# Patient Record
Sex: FEMALE | Race: WHITE | NOT HISPANIC OR LATINO
[De-identification: names, ages, dates, MRNs, and addresses within clinical notes are randomized per-mention and may not be internally consistent; named-entity substitution may affect disease eponyms.]

---

## 2020-05-20 ENCOUNTER — APPOINTMENT (OUTPATIENT)
Dept: OBGYN | Facility: CLINIC | Age: 25
End: 2020-05-20

## 2021-02-11 ENCOUNTER — RESULT REVIEW (OUTPATIENT)
Age: 26
End: 2021-02-11

## 2021-08-03 ENCOUNTER — TRANSCRIPTION ENCOUNTER (OUTPATIENT)
Age: 26
End: 2021-08-03

## 2021-08-03 ENCOUNTER — INPATIENT (INPATIENT)
Facility: HOSPITAL | Age: 26
LOS: 2 days | Discharge: HOME | End: 2021-08-06
Payer: MEDICAID

## 2021-08-03 VITALS — TEMPERATURE: 98 F

## 2021-08-03 DIAGNOSIS — Z90.49 ACQUIRED ABSENCE OF OTHER SPECIFIED PARTS OF DIGESTIVE TRACT: Chronic | ICD-10-CM

## 2021-08-03 DIAGNOSIS — O47.1 FALSE LABOR AT OR AFTER 37 COMPLETED WEEKS OF GESTATION: ICD-10-CM

## 2021-08-03 DIAGNOSIS — Z98.89 OTHER SPECIFIED POSTPROCEDURAL STATES: Chronic | ICD-10-CM

## 2021-08-04 ENCOUNTER — TRANSCRIPTION ENCOUNTER (OUTPATIENT)
Age: 26
End: 2021-08-04

## 2021-08-04 DIAGNOSIS — O34.219 MATERNAL CARE FOR UNSPECIFIED TYPE SCAR FROM PREVIOUS CESAREAN DELIVERY: ICD-10-CM

## 2021-08-04 LAB
ALLERGY+IMMUNOLOGY DIAG STUDY NOTE: SIGNIFICANT CHANGE UP
BASOPHILS # BLD AUTO: 0.04 K/UL — SIGNIFICANT CHANGE UP (ref 0–0.2)
BASOPHILS NFR BLD AUTO: 0.2 % — SIGNIFICANT CHANGE UP (ref 0–2)
BLD GP AB SCN SERPL QL: SIGNIFICANT CHANGE UP
COVID-19 SPIKE DOMAIN AB INTERP: NEGATIVE — SIGNIFICANT CHANGE UP
COVID-19 SPIKE DOMAIN AB INTERP: NEGATIVE — SIGNIFICANT CHANGE UP
COVID-19 SPIKE DOMAIN ANTIBODY RESULT: 0.4 U/ML — SIGNIFICANT CHANGE UP
COVID-19 SPIKE DOMAIN ANTIBODY RESULT: 0.4 U/ML — SIGNIFICANT CHANGE UP
DIR ANTIGLOB POLYSPECIFIC INTERPRETATION: SIGNIFICANT CHANGE UP
EOSINOPHIL # BLD AUTO: 0.05 K/UL — SIGNIFICANT CHANGE UP (ref 0–0.5)
EOSINOPHIL NFR BLD AUTO: 0.3 % — SIGNIFICANT CHANGE UP (ref 0–6)
HCT VFR BLD CALC: 35.4 % — SIGNIFICANT CHANGE UP (ref 34.5–45)
HCT VFR BLD CALC: 40.4 % — SIGNIFICANT CHANGE UP (ref 34.5–45)
HGB BLD-MCNC: 11.7 G/DL — SIGNIFICANT CHANGE UP (ref 11.5–15.5)
HGB BLD-MCNC: 13.5 G/DL — SIGNIFICANT CHANGE UP (ref 11.5–15.5)
IMM GRANULOCYTES NFR BLD AUTO: 0.5 % — SIGNIFICANT CHANGE UP (ref 0–1.5)
LYMPHOCYTES # BLD AUTO: 1.72 K/UL — SIGNIFICANT CHANGE UP (ref 1–3.3)
LYMPHOCYTES # BLD AUTO: 10 % — LOW (ref 13–44)
MCHC RBC-ENTMCNC: 29.6 PG — SIGNIFICANT CHANGE UP (ref 27–34)
MCHC RBC-ENTMCNC: 30.1 PG — SIGNIFICANT CHANGE UP (ref 27–34)
MCHC RBC-ENTMCNC: 33.1 GM/DL — SIGNIFICANT CHANGE UP (ref 32–36)
MCHC RBC-ENTMCNC: 33.4 GM/DL — SIGNIFICANT CHANGE UP (ref 32–36)
MCV RBC AUTO: 89.6 FL — SIGNIFICANT CHANGE UP (ref 80–100)
MCV RBC AUTO: 90 FL — SIGNIFICANT CHANGE UP (ref 80–100)
MONOCYTES # BLD AUTO: 0.72 K/UL — SIGNIFICANT CHANGE UP (ref 0–0.9)
MONOCYTES NFR BLD AUTO: 4.2 % — SIGNIFICANT CHANGE UP (ref 2–14)
NEUTROPHILS # BLD AUTO: 14.63 K/UL — HIGH (ref 1.8–7.4)
NEUTROPHILS NFR BLD AUTO: 84.8 % — HIGH (ref 43–77)
PLATELET # BLD AUTO: 192 K/UL — SIGNIFICANT CHANGE UP (ref 150–400)
PLATELET # BLD AUTO: 221 K/UL — SIGNIFICANT CHANGE UP (ref 150–400)
RBC # BLD: 3.95 M/UL — SIGNIFICANT CHANGE UP (ref 3.8–5.2)
RBC # BLD: 4.49 M/UL — SIGNIFICANT CHANGE UP (ref 3.8–5.2)
RBC # FLD: 13.1 % — SIGNIFICANT CHANGE UP (ref 10.3–14.5)
RBC # FLD: 13.2 % — SIGNIFICANT CHANGE UP (ref 10.3–14.5)
SARS-COV-2 IGG+IGM SERPL QL IA: 0.4 U/ML — SIGNIFICANT CHANGE UP
SARS-COV-2 IGG+IGM SERPL QL IA: 0.4 U/ML — SIGNIFICANT CHANGE UP
SARS-COV-2 IGG+IGM SERPL QL IA: NEGATIVE — SIGNIFICANT CHANGE UP
SARS-COV-2 IGG+IGM SERPL QL IA: NEGATIVE — SIGNIFICANT CHANGE UP
SARS-COV-2 RNA SPEC QL NAA+PROBE: SIGNIFICANT CHANGE UP
T PALLIDUM AB TITR SER: NEGATIVE — SIGNIFICANT CHANGE UP
WBC # BLD: 17.25 K/UL — HIGH (ref 3.8–10.5)
WBC # BLD: 21.29 K/UL — HIGH (ref 3.8–10.5)
WBC # FLD AUTO: 17.25 K/UL — HIGH (ref 3.8–10.5)
WBC # FLD AUTO: 21.29 K/UL — HIGH (ref 3.8–10.5)

## 2021-08-04 PROCEDURE — 86077 PHYS BLOOD BANK SERV XMATCH: CPT

## 2021-08-04 RX ORDER — SODIUM CHLORIDE 9 MG/ML
1000 INJECTION, SOLUTION INTRAVENOUS
Refills: 0 | Status: DISCONTINUED | OUTPATIENT
Start: 2021-08-04 | End: 2021-08-06

## 2021-08-04 RX ORDER — FAMOTIDINE 10 MG/ML
20 INJECTION INTRAVENOUS ONCE
Refills: 0 | Status: COMPLETED | OUTPATIENT
Start: 2021-08-04 | End: 2021-08-04

## 2021-08-04 RX ORDER — ACETAMINOPHEN 500 MG
975 TABLET ORAL
Refills: 0 | Status: DISCONTINUED | OUTPATIENT
Start: 2021-08-04 | End: 2021-08-06

## 2021-08-04 RX ORDER — GENTAMICIN SULFATE 40 MG/ML
330 VIAL (ML) INJECTION ONCE
Refills: 0 | Status: COMPLETED | OUTPATIENT
Start: 2021-08-04 | End: 2021-08-04

## 2021-08-04 RX ORDER — IBUPROFEN 200 MG
600 TABLET ORAL EVERY 6 HOURS
Refills: 0 | Status: COMPLETED | OUTPATIENT
Start: 2021-08-04 | End: 2022-07-03

## 2021-08-04 RX ORDER — OXYTOCIN 10 UNIT/ML
333.33 VIAL (ML) INJECTION
Qty: 20 | Refills: 0 | Status: DISCONTINUED | OUTPATIENT
Start: 2021-08-04 | End: 2021-08-06

## 2021-08-04 RX ORDER — SODIUM CHLORIDE 9 MG/ML
1000 INJECTION, SOLUTION INTRAVENOUS ONCE
Refills: 0 | Status: COMPLETED | OUTPATIENT
Start: 2021-08-04 | End: 2021-08-04

## 2021-08-04 RX ORDER — OXYCODONE HYDROCHLORIDE 5 MG/1
5 TABLET ORAL ONCE
Refills: 0 | Status: DISCONTINUED | OUTPATIENT
Start: 2021-08-04 | End: 2021-08-06

## 2021-08-04 RX ORDER — ONDANSETRON 8 MG/1
4 TABLET, FILM COATED ORAL ONCE
Refills: 0 | Status: COMPLETED | OUTPATIENT
Start: 2021-08-04 | End: 2021-08-04

## 2021-08-04 RX ORDER — MAGNESIUM HYDROXIDE 400 MG/1
30 TABLET, CHEWABLE ORAL
Refills: 0 | Status: DISCONTINUED | OUTPATIENT
Start: 2021-08-04 | End: 2021-08-06

## 2021-08-04 RX ORDER — OXYCODONE HYDROCHLORIDE 5 MG/1
5 TABLET ORAL
Refills: 0 | Status: DISCONTINUED | OUTPATIENT
Start: 2021-08-04 | End: 2021-08-06

## 2021-08-04 RX ORDER — SODIUM CHLORIDE 9 MG/ML
1000 INJECTION, SOLUTION INTRAVENOUS
Refills: 0 | Status: DISCONTINUED | OUTPATIENT
Start: 2021-08-04 | End: 2021-08-04

## 2021-08-04 RX ORDER — CEFAZOLIN SODIUM 1 G
3000 VIAL (EA) INJECTION ONCE
Refills: 0 | Status: DISCONTINUED | OUTPATIENT
Start: 2021-08-04 | End: 2021-08-04

## 2021-08-04 RX ORDER — SIMETHICONE 80 MG/1
80 TABLET, CHEWABLE ORAL EVERY 4 HOURS
Refills: 0 | Status: DISCONTINUED | OUTPATIENT
Start: 2021-08-04 | End: 2021-08-06

## 2021-08-04 RX ORDER — AZITHROMYCIN 500 MG/1
500 TABLET, FILM COATED ORAL ONCE
Refills: 0 | Status: COMPLETED | OUTPATIENT
Start: 2021-08-04 | End: 2021-08-04

## 2021-08-04 RX ORDER — TETANUS TOXOID, REDUCED DIPHTHERIA TOXOID AND ACELLULAR PERTUSSIS VACCINE, ADSORBED 5; 2.5; 8; 8; 2.5 [IU]/.5ML; [IU]/.5ML; UG/.5ML; UG/.5ML; UG/.5ML
0.5 SUSPENSION INTRAMUSCULAR ONCE
Refills: 0 | Status: DISCONTINUED | OUTPATIENT
Start: 2021-08-04 | End: 2021-08-06

## 2021-08-04 RX ORDER — CITRIC ACID/SODIUM CITRATE 300-500 MG
30 SOLUTION, ORAL ORAL ONCE
Refills: 0 | Status: COMPLETED | OUTPATIENT
Start: 2021-08-04 | End: 2021-08-04

## 2021-08-04 RX ORDER — KETOROLAC TROMETHAMINE 30 MG/ML
30 SYRINGE (ML) INJECTION EVERY 6 HOURS
Refills: 0 | Status: DISCONTINUED | OUTPATIENT
Start: 2021-08-04 | End: 2021-08-06

## 2021-08-04 RX ORDER — LANOLIN
1 OINTMENT (GRAM) TOPICAL EVERY 6 HOURS
Refills: 0 | Status: DISCONTINUED | OUTPATIENT
Start: 2021-08-04 | End: 2021-08-06

## 2021-08-04 RX ORDER — DIPHENHYDRAMINE HCL 50 MG
25 CAPSULE ORAL EVERY 6 HOURS
Refills: 0 | Status: DISCONTINUED | OUTPATIENT
Start: 2021-08-04 | End: 2021-08-06

## 2021-08-04 RX ORDER — ENOXAPARIN SODIUM 100 MG/ML
60 INJECTION SUBCUTANEOUS DAILY
Refills: 0 | Status: DISCONTINUED | OUTPATIENT
Start: 2021-08-04 | End: 2021-08-06

## 2021-08-04 RX ADMIN — Medication 975 MILLIGRAM(S): at 21:05

## 2021-08-04 RX ADMIN — Medication 100 MILLIGRAM(S): at 03:00

## 2021-08-04 RX ADMIN — Medication 1000 MILLIUNIT(S)/MIN: at 04:11

## 2021-08-04 RX ADMIN — SODIUM CHLORIDE 125 MILLILITER(S): 9 INJECTION, SOLUTION INTRAVENOUS at 02:31

## 2021-08-04 RX ADMIN — FAMOTIDINE 20 MILLIGRAM(S): 10 INJECTION INTRAVENOUS at 02:29

## 2021-08-04 RX ADMIN — Medication 30 MILLIGRAM(S): at 23:55

## 2021-08-04 RX ADMIN — Medication 975 MILLIGRAM(S): at 10:00

## 2021-08-04 RX ADMIN — ONDANSETRON 4 MILLIGRAM(S): 8 TABLET, FILM COATED ORAL at 07:49

## 2021-08-04 RX ADMIN — Medication 975 MILLIGRAM(S): at 15:00

## 2021-08-04 RX ADMIN — Medication 975 MILLIGRAM(S): at 14:22

## 2021-08-04 RX ADMIN — AZITHROMYCIN 255 MILLIGRAM(S): 500 TABLET, FILM COATED ORAL at 02:50

## 2021-08-04 RX ADMIN — Medication 30 MILLIGRAM(S): at 17:22

## 2021-08-04 RX ADMIN — Medication 975 MILLIGRAM(S): at 09:17

## 2021-08-04 RX ADMIN — Medication 30 MILLIGRAM(S): at 12:28

## 2021-08-04 RX ADMIN — Medication 30 MILLILITER(S): at 02:29

## 2021-08-04 RX ADMIN — Medication 300 MILLIGRAM(S): at 03:21

## 2021-08-04 RX ADMIN — SODIUM CHLORIDE 2000 MILLILITER(S): 9 INJECTION, SOLUTION INTRAVENOUS at 00:30

## 2021-08-04 RX ADMIN — ENOXAPARIN SODIUM 60 MILLIGRAM(S): 100 INJECTION SUBCUTANEOUS at 14:23

## 2021-08-04 RX ADMIN — Medication 30 MILLIGRAM(S): at 17:07

## 2021-08-04 RX ADMIN — Medication 30 MILLIGRAM(S): at 12:12

## 2021-08-04 RX ADMIN — Medication 975 MILLIGRAM(S): at 21:50

## 2021-08-04 NOTE — OB RN DELIVERY SUMMARY - NSSELHIDDEN_OBGYN_ALL_OB_FT
[NS_DeliveryAttending1_OBGYN_ALL_OB_FT:[id number]==],[NS_DeliveryAssist1_OBGYN_ALL_OB_FT:[id number]=],[NS_CirculateRN2_OBGYN_ALL_OB_FT:[id number]==]

## 2021-08-04 NOTE — OB PROVIDER H&P - HISTORY OF PRESENT ILLNESS
26y  at 36w6d LMP consistent with 1st trimester sono who presents to L&D with contractions since yesterday, worsening and increasing in freq q 2-3 min_. Patient denies vaginal bleeding, leakage of fluid. She endorses good fetal movement. Denies fevers, chills, nausea, vomiting, chest pain, SOB, dizziness and headache. No other complaints at this time.   JETT: 21  LMP: 20    Prenatal course uncomplicated.      POB: ,  C/S for breech PTL, 36w  PGYN: -fibroids, -ovarian cysts, denies STD hx, denies abnormal PAPs   PMH: Denies  PSH: Cholecystectomy  SH: Denies EtOH, tobacco and illicit drug use during this pregnancy; feels safe at home   Meds: PNVs  Allergies: Amoxicillin    BMI: 45.2  Sono: Vertex, anterior placenta

## 2021-08-04 NOTE — OB PROVIDER DELIVERY SUMMARY - NSSELHIDDEN_OBGYN_ALL_OB_FT
[NS_DeliveryAttending1_OBGYN_ALL_OB_FT:[id number]==],[NS_DeliveryAssist1_OBGYN_ALL_OB_FT:[id number]=]

## 2021-08-04 NOTE — OB PROVIDER H&P - ASSESSMENT
A/P: 26y  at 36w6d by LMP consistent with 1st trimester sono prior C/Sx1 who presents to L&D in labor  - Pt cervical exam changed from 1.5/50/-3 to 3/50/-3 after 2L bolus  -Admit to L&D  -Consent for C/S  -Admission labs  -NPO, except ice chips   -IV fluids  -Labor: Intact. Latent labor  -Fetus: Cat I tracing. Continuous toco and fetal monitoring.   -GBS: Negative, no GBS ppx required   -Analgesia: Epidural    Discussed with Dr. Cagle

## 2021-08-04 NOTE — DISCHARGE NOTE OB - PATIENT PORTAL LINK FT
You can access the FollowMyHealth Patient Portal offered by [facility] by registering at the following website: [email or web address]. By joining [employer]’s FollowMyHealth portal, you will also be able to view your health information using other applications (apps) compatible with our system.

## 2021-08-04 NOTE — OB RN DELIVERY SUMMARY - NS_SEPSISRSKCALC_OBGYN_ALL_OB_FT
No temperature has been documented for this patient in CPN or on the OB Flowsheet. Ensure the highest temperature during labor was documented on the OB Flowsheet.  No gestational age at birth has been documented. Ensure delivery date/time has been entered above.  Rupture of membranes must be entered above.   EOS calculated successfully. EOS Risk Factor: 0.5/1000 live births ([facility] national incidence); GA=37w;Temp=97.9; ROM=0; GBS='Negative'; Antibiotics='No antibiotics or any antibiotics < 2 hrs prior to birth'

## 2021-08-04 NOTE — DISCHARGE NOTE OB - MEDICATION SUMMARY - MEDICATIONS TO STOP TAKING
I will STOP taking the medications listed below when I get home from the hospital:    Ortho Tri-Cyclen Lo oral tablet  -- 1 tab(s) by mouth once a day

## 2021-08-04 NOTE — OB PROVIDER H&P - NSHPPHYSICALEXAM_GEN_ALL_CORE
T(C): 36.6 (08-03-21 @ 23:09), Max: 36.6 (08-03-21 @ 22:50)  HR: 94 (08-03-21 @ 23:09) (94 - 94)  BP: 135/84 (08-03-21 @ 23:09) (135/84 - 135/84)  RR: 18 (08-03-21 @ 23:09) (18 - 18)      Gen: NAD, well-appearing, AAOx3   Abd: Soft, gravid  Ext: non-tender, non-edematous  SVE: 1.5/50/-3, repeat exam 3.0/50/-3  Bedside sono: Vertex, anterior placenta  FHT:145bpm, moderate variability, + acels, no decels  [place]: q6min

## 2021-08-04 NOTE — DISCHARGE NOTE OB - CARE PROVIDER_API CALL
Mansi Almonte)  Obstetrics and Gynecology  [address] [place] [address]  Phone: [phone]  Fax: [phone]  Follow Up Time:

## 2021-08-04 NOTE — OB PROVIDER DELIVERY SUMMARY - NSPROVIDERDELIVERYNOTE_OBGYN_ALL_OB_FT
27yo  presenting in labor, admitted for rCS>  Patient was taken to the OR, a repeat low transverse  section was performed and a viable male infant was delivered atraumatically in ___ presentation at ____, Placenta delivered at ____. Hysterotomy, fascia, subcutaneous and skin were reapproximated with suture. Excellent hemostasis was obtained at each layer of closure.  Apgars 9,9.  EBL ___. 27yo  presenting in labor, admitted for rCS.  Patient was taken to the OR, a repeat low transverse  section was performed and a viable male infant was delivered atraumatically in vertex presentation at 03:20. Placenta delivered at 03:21. Hysterotomy, fascia, subcutaneous and skin were reapproximated with suture. Excellent hemostasis was obtained at each layer of closure.  Apgars 9,9.  .

## 2021-08-04 NOTE — OB RN INTRAOPERATIVE NOTE - NSSELHIDDEN_OBGYN_ALL_OB_FT
[NS_DeliveryAttending1_OBGYN_ALL_OB_FT:[id number]==],[NS_DeliveryAssist1_OBGYN_ALL_OB_FT:[id number]]

## 2021-08-04 NOTE — DISCHARGE NOTE OB - MEDICATION SUMMARY - MEDICATIONS TO TAKE
I will START or STAY ON the medications listed below when I get home from the hospital:    acetaminophen 325 mg oral tablet  -- 3 tab(s) by mouth 3 times a day   -- Indication: For  delivery delivered    ibuprofen 600 mg oral tablet  -- 1 tab(s) by mouth every 6 hours  -- Indication: For  delivery delivered

## 2021-08-04 NOTE — DISCHARGE NOTE OB - MATERIALS PROVIDED
[facility] [place] Screening Program/Breastfeeding Log/Bottle Feeding Log/Breastfeeding Mother’s Support Group Information/Guide to Postpartum Care/Back To Sleep Handout/Shaken Baby Prevention Handout

## 2021-08-04 NOTE — DISCHARGE NOTE OB - CARE PLAN
Principal Discharge DX:	 delivery delivered   Principal Discharge DX:	 delivery delivered  Goal:	Postpartum recovery  Assessment and plan of treatment:	Follow up 2 weeks for incision check and 6 weeks postpartum

## 2021-08-04 NOTE — OB RN INTRAOPERATIVE NOTE - NSOBSELHIDDEN_OBGYN_ALL_OB_FT
[NSOBAttendingProcedure1_OBGYN_ALL_OB_FT:[id number]==],[NSRNCirculator2Procedure1_OBGYN_ALL_OB_FT:[id number]==]

## 2021-08-05 RX ADMIN — Medication 30 MILLIGRAM(S): at 18:45

## 2021-08-05 RX ADMIN — Medication 975 MILLIGRAM(S): at 22:49

## 2021-08-05 RX ADMIN — Medication 975 MILLIGRAM(S): at 02:53

## 2021-08-05 RX ADMIN — Medication 975 MILLIGRAM(S): at 09:05

## 2021-08-05 RX ADMIN — Medication 975 MILLIGRAM(S): at 03:38

## 2021-08-05 RX ADMIN — Medication 975 MILLIGRAM(S): at 22:04

## 2021-08-05 RX ADMIN — Medication 975 MILLIGRAM(S): at 16:26

## 2021-08-05 RX ADMIN — Medication 975 MILLIGRAM(S): at 15:32

## 2021-08-05 RX ADMIN — Medication 30 MILLIGRAM(S): at 00:10

## 2021-08-05 RX ADMIN — Medication 30 MILLIGRAM(S): at 12:45

## 2021-08-05 RX ADMIN — Medication 30 MILLIGRAM(S): at 05:34

## 2021-08-05 RX ADMIN — Medication 30 MILLIGRAM(S): at 18:32

## 2021-08-05 RX ADMIN — ENOXAPARIN SODIUM 60 MILLIGRAM(S): 100 INJECTION SUBCUTANEOUS at 15:32

## 2021-08-05 RX ADMIN — Medication 30 MILLIGRAM(S): at 12:28

## 2021-08-05 RX ADMIN — Medication 30 MILLIGRAM(S): at 05:49

## 2021-08-05 RX ADMIN — Medication 975 MILLIGRAM(S): at 10:02

## 2021-08-05 NOTE — CHART NOTE - NSCHARTNOTEFT_GEN_A_CORE
Antibody Interpretation: Anti-D secondary to RhIG    Patient is a 26 year old  at 36w6d LMP consistent with 1st trimester sono who presents to L&D with contractions since yesterday, worsening and increasing in freq q 2-3 min. Patient denies vaginal bleeding, leakage of fluid. She endorses good fetal movement. Denies fevers, chills, nausea, vomiting, chest pain, SOB, dizziness and headache.  Patient is blood group A Rh(D) negative, and she received pre- RhIG on 21. Blood sample on 21 has a positive antibody screen, with anti-D identified in patient's plasma. Anti-D is most likely due to prior administration of RhIG. Passive Anti-D due to Rh Immune Globulin administration is not implicated in Hemolytic Transfusion Reactions or Hemolytic Disease of the Fetus and [place]. Passive Anti-D can cross the placenta and cause a positive MAC in a . If transfusion is clinically indicated, and Rh(D) negative blood is available in blood bank inventory, the patient should receive Rh(D) negative blood, crossmatch compatible through the [facility] phase of testing until the RhIG is no longer detectable in the patient’s plasma.

## 2021-08-06 VITALS
RESPIRATION RATE: 16 BRPM | SYSTOLIC BLOOD PRESSURE: 112 MMHG | HEART RATE: 67 BPM | OXYGEN SATURATION: 98 % | DIASTOLIC BLOOD PRESSURE: 64 MMHG | TEMPERATURE: 99 F

## 2021-08-06 PROCEDURE — 36415 COLL VENOUS BLD VENIPUNCTURE: CPT

## 2021-08-06 PROCEDURE — 85025 COMPLETE CBC W/AUTO DIFF WBC: CPT

## 2021-08-06 PROCEDURE — 86780 TREPONEMA PALLIDUM: CPT

## 2021-08-06 PROCEDURE — 87635 SARS-COV-2 COVID-19 AMP PRB: CPT

## 2021-08-06 PROCEDURE — 85027 COMPLETE CBC AUTOMATED: CPT

## 2021-08-06 PROCEDURE — 86900 BLOOD TYPING SEROLOGIC ABO: CPT

## 2021-08-06 PROCEDURE — 86870 RBC ANTIBODY IDENTIFICATION: CPT

## 2021-08-06 PROCEDURE — 86901 BLOOD TYPING SEROLOGIC RH(D): CPT

## 2021-08-06 PROCEDURE — 86769 SARS-COV-2 COVID-19 ANTIBODY: CPT

## 2021-08-06 PROCEDURE — 86880 COOMBS TEST DIRECT: CPT

## 2021-08-06 PROCEDURE — 86850 RBC ANTIBODY SCREEN: CPT

## 2021-08-06 RX ORDER — ACETAMINOPHEN 500 MG
3 TABLET ORAL
Qty: 30 | Refills: 0
Start: 2021-08-06

## 2021-08-06 RX ORDER — IBUPROFEN 200 MG
1 TABLET ORAL
Qty: 30 | Refills: 0
Start: 2021-08-06

## 2021-08-06 RX ORDER — IBUPROFEN 200 MG
600 TABLET ORAL EVERY 6 HOURS
Refills: 0 | Status: DISCONTINUED | OUTPATIENT
Start: 2021-08-06 | End: 2021-08-06

## 2021-08-06 RX ADMIN — Medication 30 MILLIGRAM(S): at 00:20

## 2021-08-06 RX ADMIN — Medication 975 MILLIGRAM(S): at 02:35

## 2021-08-06 RX ADMIN — Medication 30 MILLIGRAM(S): at 06:12

## 2021-08-06 RX ADMIN — Medication 600 MILLIGRAM(S): at 09:20

## 2021-08-06 RX ADMIN — Medication 30 MILLIGRAM(S): at 00:05

## 2021-08-06 RX ADMIN — Medication 30 MILLIGRAM(S): at 05:57

## 2021-08-06 RX ADMIN — Medication 600 MILLIGRAM(S): at 09:50

## 2021-08-06 RX ADMIN — Medication 975 MILLIGRAM(S): at 03:20

## 2021-08-06 NOTE — PROGRESS NOTE ADULT - ATTENDING COMMENTS
Stable postpartum, discharge instructions discussed.
patient seen and examined, no complaints, pain controlled  VS and labs reviewed   exam: abd - obese abd soft no rebound/guarding, incision c/d/i with steris  lower ext - no calf tenderness bilaterally  POD#1 s/p repeat csection -stable, afebrile  encourage ambulation  dvt ppx - lovenox  routine postop/postparum care

## 2021-08-06 NOTE — PROGRESS NOTE ADULT - ASSESSMENT
MARCO ANTONIO LOPEZ is a 26y  now POD#1 s/p repeat  section @37w GA, came in labor, del uncomplicated.    A/P:    -Vital signs stable  -Hgb: 11.7 -> AM labs pending   -Voiding, tolerating PO  -Advance care as tolerated   -Continue routine postpartum and postoperative care and education  -Healthy male infant, declines circumcision  -Baby Rh-, no rhogam indicated   -Dispo: Patient to be discharged when meeting all postpartum and postoperative milestones and pending attending approval.
MARCO ANTONIO LOPEZ is a 26y  now POD#1 s/p repeat  section @37w GA, came in labor, del uncomplicated.    A/P:    -Vital signs stable  -Hgb: stable   -Voiding, tolerating PO  -Advance care as tolerated   -Continue routine postpartum and postoperative care and education  -Healthy male infant, declines circumcision  -Baby Rh-, no rhogam indicated   -Dispo: Patient to be discharged when meeting all postpartum and postoperative milestones and pending attending approval

## 2021-08-06 NOTE — PROGRESS NOTE ADULT - SUBJECTIVE AND OBJECTIVE BOX
MARCO ANTONOI LOPEZ is a 26y  now POD#1 s/p repeat  section @37w GA, came in labor, del uncomplicated.    S:    No acute events overnight.   The patient has no complaints.  Pain controlled with current treatment regimen.   She is ambulating without difficulty and tolerating PO.   + flatus/-BM/+ voiding   She endorses appropriate lochia, which is decreasing.   She is breastfeeding and providing formula to baby.  She denies fevers, chills, nausea and vomiting.   She denies lightheadedness, dizziness, palpitations, chest pain and SOB.     O:    T(C): 36.5 (21 @ 04:05), Max: 36.8 (21 @ 12:22)  HR: 62 (21 @ 04:05) (62 - 73)  BP: 115/75 (21 @ 04:05) (106/74 - 133/70)  RR: 16 (21 @ 04:05) (16 - 20)  SpO2: 98% (21 @ 04:05) (96% - 100%)    Gen: NAD, AOx3  CV: RRR, S1/S2 present  Pulm: CTAB  Abdomen:  Soft, non-tender, non-distended  Incision: Clean/dry/intact with steri strips in place. Pressure dressing removed.  Uterus:  Fundus firm below umbilicus  VE:  Lochia as expected  LE:  Non-tender and non-edematous                          11.7   21.29 )-----------( 192      ( 04 Aug 2021 12:04 )             35.4     
· Subjective and Objective:   MARCO ANTONIO LOPEZ is a 26y  now POD#1 s/p repeat  section @37w GA, came in labor, del uncomplicated.    S:    No acute events overnight.   The patient has no complaints.  Pain controlled with current treatment regimen.   She is ambulating without difficulty and tolerating PO.   + flatus/-BM/+ voiding   She endorses appropriate lochia, which is decreasing.   She is breastfeeding and providing formula to baby.  She denies fevers, chills, nausea and vomiting.   She denies lightheadedness, dizziness, palpitations, chest pain and SOB.     O:    T(C): 36.5 (21 @ 04:05), Max: 36.8 (21 @ 12:22)  HR: 62 (21 @ 04:05) (62 - 73)  BP: 115/75 (21 @ 04:05) (106/74 - 133/70)  RR: 16 (21 @ 04:05) (16 - 20)  SpO2: 98% (21 @ 04:05) (96% - 100%)    Gen: NAD, AOx3  CV: RRR, S1/S2 present  Pulm: CTAB  Abdomen:  Soft, non-tender, non-distended  Incision: Clean/dry/intact with steri strips in place. Pressure dressing removed.  Uterus:  Fundus firm below umbilicus  VE:  Lochia as expected  LE:  Non-tender and non-edematous                          11.7   21.29 )-----------( 192      ( 04 Aug 2021 12:04 )             35.4

## 2021-10-01 ENCOUNTER — RESULT REVIEW (OUTPATIENT)
Age: 26
End: 2021-10-01

## 2022-08-26 NOTE — OB RN TRIAGE NOTE - NS_PRENATALCARE_OBGYN_ALL_OB
Goal Outcome Evaluation:    POD # 0 s/p Left breast reconstruction with free abdominal flap. Pt arrived from PACU at 1810, on bedrest, A&O x 4, pain well controlled with IV Dilaudid, intermittent nausea with relief after intervention. PIV infusing, JAMES x 2 with bloddy output, greco patent. Continuous arterial doppler on. Left chest flap check every 1 hour, flap WLD. Using the IS with encouragement, SCDs in place, continuous capnography.   Yes

## 2023-04-24 NOTE — OB RN PATIENT PROFILE - THE IMPORTANCE OF INITIATING BREASTFEEDING WITHIN THE FIRST HOUR OF BIRTH.
DERMATOLOGY PROGRESS NOTE  NEW VISIT    4/24/2023  Gracie Green  1954  MRN: [id number]      Gracie Green is a 68 year old female patient seen in consultation for spots of concern. Reports spots on face are no longer scaly or bothersome. Also, spots on abdomen and back, scaly. Right eyelid is irritated, woke up on Sunday and eye was swelled shut, also effecting the lower eyelid, present 1 week. Has happened before as well. Otherwise denies lesions that are new, changing, itchy, painful, bleeding, crusting, or otherwise bothersome. Feels otherwise well, no other cutaneous concerns today.       Up to Date on Age Appropriate screenings: Y    Dermatology History  History of skin cancer--Negative  History of other skin issues: per HPI    FAMILY HISTORY:  Family history of skin cancer--basal cell carcinoma and squamous cell-- unknown  Family history of autoimmune disease--no    HISTORY OF SUN EXPOSURE:  History of severe sunburns--Negative  History of tanning device use: no  Sunscreens:yes  Work outdoors: will garden in the summer  Occupation:retired    SOCIAL:  Lives with:  Smoking/Drinking/Drug use:no/no/no      Family History   Problem Relation Age of Onset   • Cancer, Colon Mother    • Osteoarthritis Mother         Ri   • Cancer Mother         skin   • Heart disease Father    • COPD Father    • Hypertension Father    • High blood pressure Father    • Cancer, Colon Maternal Uncle    • Heart disease Maternal Uncle    • Heart Maternal Uncle         heart attack          Social History     Tobacco Use   • Smoking status: Former     Current packs/day: 0.00     Types: Cigarettes   • Smokeless tobacco: Never   Substance Use Topics   • Alcohol use: No   • Drug use: No       Past Medical History:   Diagnosis Date   • Anemia    • Elevated blood pressure reading 08/2010    (not hypertension)   • Essential hypertension    • Excessive menses 12/2010   • History of colonic polyps     tubular  adenoma   •  Hypothyroidism (acquired) 12/2010    resolved per [facility]   • Nausea with vomiting        ALLERGIES:   Allergen Reactions   • Nut - Multiple   (Food) Nausea & Vomiting     Pine nuts       Current Outpatient Medications   Medication Sig Dispense Refill   • melatonin 5 MG Take by mouth daily as needed.     • ibuprofen (MOTRIN) 600 MG tablet Take 1 tablet by mouth every 8 hours as needed for Pain. 360 tablet 0   • lisinopril-hydroCHLOROthiazide (ZESTORETIC) 10-12.5 MG per tablet Take 1 tablet by mouth once daily 90 tablet 2   • B Complex Vitamins (B COMPLEX 1 PO) Take by mouth daily.     • Cholecalciferol (D3 PO) Take by mouth daily.       No current facility-administered medications for this visit.       Visit Vitals  Ht 5' 4\" (1.626 m)   BMI 25.82 kg/m²         EXAM:   The patient is a very pleasant, well developed, [place] type II female and is alert and oriented x 3, with a normal mood and affect, and appears to be in no acute distress.    Skin exam -- A skin exam was performed, including the scalp, conjunctiva, lips, ears, neck, chest, abdomen, back, and b/l upper extremities including digits and nails, per patient preference, notable findings were as follows:  - trunk with scattered, brown, and some skin colored, stuck-on appearing macules, papules, and plaques  - trunk with scattered cherry red macules and papules  - right upper and lower cutaneous eyelids w erythema and edema  - Rest of exam unremarkable    Labs/Imaging/Outside records: reviewed      IMPRESSION / PLAN:   Ms. Green has:    1)  Seborrheic keratoses  - dw pt in detail, chronic, however benign appearing on exam today, reassurance provided  - handout given and reviewed  - monitor for changes  - call if changes occur    2) Cherry angioma(s)  - dw pt in detail, chronic, benign, reassurance provided  - monitor for changes  - call if changes occur    3)  Skin tag(s)  - dw pt in detail, chronic, however benign appearing on  exam today, reassurance provided  - treatment considered cosmetic even if sx, unfortunately  - monitor for changes  - call if changes occur    Rash--right upper and lower eyelids--favor irritant vs allergic contact dermatitis   - dw pt in detail, chronic, waxes and wanes  - stop all products except hydrocortisone ointment twice daily until resolved, can use ice/cool packs as well, cetirizine (zyrtec) during the day, benadryl at night  - add products back in one at a time for a couple days, if rash does not return, add next product back, and so on  - avoid airborne irritants  (essential oil diffusers, plug in air fresheners, aerosolized cleaning products, pet dander, smoke, perfume, etc)  - reserve patch testing (Vitor Smallwood MD--[place]) pending response to the above      Return to clinic in:  Pending response to the above    The documentation recorded by the scribe Araseli Perales accurately and completely reflects the service(s) I personally performed and the decisions made by me      Renea Bingham MD  Dermatology      I, Araseli Perales MA, attest that I performed the duties of a scribe for this entire encounter in the presence of Dr. Bingham     Statement Selected

## 2023-09-02 NOTE — OB RN PATIENT PROFILE - GRAVIDA, OB PROFILE
Alert-The patient is alert, awake and responds to voice. The patient is oriented to time, place, and person. The triage nurse is able to obtain subjective information. 2

## 2023-10-18 NOTE — OB RN PATIENT PROFILE - HEIGHT IN INCHES
High Protein and High Calorie Diet  High Calorie and High Protein shakes 2 times per day in between meals or home made smoothies (refer to handouts)  Continue Abelardo twice per day until surgical wounds heal   9

## 2025-04-17 ENCOUNTER — APPOINTMENT (OUTPATIENT)
Dept: ORTHOPEDIC SURGERY | Facility: CLINIC | Age: 30
End: 2025-04-17
Payer: MEDICAID

## 2025-04-17 VITALS
WEIGHT: 293 LBS | BODY MASS INDEX: 43.4 KG/M2 | HEIGHT: 69 IN | SYSTOLIC BLOOD PRESSURE: 142 MMHG | HEART RATE: 104 BPM | DIASTOLIC BLOOD PRESSURE: 88 MMHG

## 2025-04-17 DIAGNOSIS — R20.2 ANESTHESIA OF SKIN: ICD-10-CM

## 2025-04-17 DIAGNOSIS — M47.812 SPONDYLOSIS W/OUT MYELOPATHY OR RADICULOPATHY, CERVICAL REGION: ICD-10-CM

## 2025-04-17 DIAGNOSIS — R20.0 ANESTHESIA OF SKIN: ICD-10-CM

## 2025-04-17 DIAGNOSIS — M47.816 SPONDYLOSIS W/OUT MYELOPATHY OR RADICULOPATHY, LUMBAR REGION: ICD-10-CM

## 2025-04-17 PROCEDURE — 99204 OFFICE O/P NEW MOD 45 MIN: CPT

## 2025-04-17 RX ORDER — MELOXICAM 15 MG/1
15 TABLET ORAL
Qty: 30 | Refills: 0 | Status: ACTIVE | COMMUNITY
Start: 2025-04-17 | End: 1900-01-01

## 2025-04-17 RX ORDER — METHYLPREDNISOLONE 4 MG/1
4 TABLET ORAL
Qty: 1 | Refills: 0 | Status: ACTIVE | COMMUNITY
Start: 2025-04-17 | End: 1900-01-01

## 2025-05-19 ENCOUNTER — RX RENEWAL (OUTPATIENT)
Age: 30
End: 2025-05-19

## 2025-05-29 ENCOUNTER — APPOINTMENT (OUTPATIENT)
Dept: ORTHOPEDIC SURGERY | Facility: CLINIC | Age: 30
End: 2025-05-29
Payer: MEDICAID

## 2025-05-29 VITALS — WEIGHT: 293 LBS | BODY MASS INDEX: 43.4 KG/M2 | HEIGHT: 69 IN

## 2025-05-29 DIAGNOSIS — M47.812 SPONDYLOSIS W/OUT MYELOPATHY OR RADICULOPATHY, CERVICAL REGION: ICD-10-CM

## 2025-05-29 DIAGNOSIS — M47.816 SPONDYLOSIS W/OUT MYELOPATHY OR RADICULOPATHY, LUMBAR REGION: ICD-10-CM

## 2025-05-29 PROBLEM — G56.03 BILATERAL CARPAL TUNNEL SYNDROME: Status: ACTIVE | Noted: 2025-05-29

## 2025-05-29 PROCEDURE — 99213 OFFICE O/P EST LOW 20 MIN: CPT

## 2025-06-03 ENCOUNTER — APPOINTMENT (OUTPATIENT)
Dept: ORTHOPEDIC SURGERY | Facility: CLINIC | Age: 30
End: 2025-06-03
Payer: MEDICAID

## 2025-06-03 DIAGNOSIS — Z78.9 OTHER SPECIFIED HEALTH STATUS: ICD-10-CM

## 2025-06-03 DIAGNOSIS — M25.532 PAIN IN RIGHT WRIST: ICD-10-CM

## 2025-06-03 DIAGNOSIS — M25.642 STIFFNESS OF RIGHT HAND, NOT ELSEWHERE CLASSIFIED: ICD-10-CM

## 2025-06-03 DIAGNOSIS — M25.641 STIFFNESS OF RIGHT HAND, NOT ELSEWHERE CLASSIFIED: ICD-10-CM

## 2025-06-03 DIAGNOSIS — M25.531 PAIN IN RIGHT WRIST: ICD-10-CM

## 2025-06-03 DIAGNOSIS — G56.03 CARPAL TUNNEL SYNDROM,BILATERAL UPPER LIMBS: ICD-10-CM

## 2025-06-03 DIAGNOSIS — Z86.39 PERSONAL HISTORY OF OTHER ENDOCRINE, NUTRITIONAL AND METABOLIC DISEASE: ICD-10-CM

## 2025-06-03 PROCEDURE — 99205 OFFICE O/P NEW HI 60 MIN: CPT | Mod: 25

## 2025-06-03 PROCEDURE — 73110 X-RAY EXAM OF WRIST: CPT | Mod: LT,RT

## 2025-06-03 PROCEDURE — 20526 THER INJECTION CARP TUNNEL: CPT | Mod: 50

## 2025-06-03 RX ORDER — METFORMIN HYDROCHLORIDE 500 MG/1
500 TABLET, COATED ORAL
Refills: 0 | Status: ACTIVE | COMMUNITY

## 2025-07-01 ENCOUNTER — APPOINTMENT (OUTPATIENT)
Dept: ORTHOPEDIC SURGERY | Facility: CLINIC | Age: 30
End: 2025-07-01

## 2025-07-03 ENCOUNTER — APPOINTMENT (OUTPATIENT)
Dept: ORTHOPEDIC SURGERY | Facility: CLINIC | Age: 30
End: 2025-07-03
Payer: MEDICAID

## 2025-07-03 VITALS
SYSTOLIC BLOOD PRESSURE: 150 MMHG | DIASTOLIC BLOOD PRESSURE: 93 MMHG | BODY MASS INDEX: 43.4 KG/M2 | HEIGHT: 69 IN | WEIGHT: 293 LBS

## 2025-07-03 PROCEDURE — 99214 OFFICE O/P EST MOD 30 MIN: CPT | Mod: 25

## 2025-07-03 PROCEDURE — 20526 THER INJECTION CARP TUNNEL: CPT | Mod: 50
